# Patient Record
Sex: FEMALE | Race: WHITE | NOT HISPANIC OR LATINO | URBAN - NONMETROPOLITAN AREA
[De-identification: names, ages, dates, MRNs, and addresses within clinical notes are randomized per-mention and may not be internally consistent; named-entity substitution may affect disease eponyms.]

---

## 2021-04-23 ENCOUNTER — OFFICE VISIT (OUTPATIENT)
Dept: URBAN - NONMETROPOLITAN AREA CLINIC 13 | Facility: CLINIC | Age: 66
End: 2021-04-23
Payer: MEDICARE

## 2021-04-23 ENCOUNTER — LAB OUTSIDE AN ENCOUNTER (OUTPATIENT)
Dept: URBAN - NONMETROPOLITAN AREA CLINIC 13 | Facility: CLINIC | Age: 66
End: 2021-04-23

## 2021-04-23 ENCOUNTER — DASHBOARD ENCOUNTERS (OUTPATIENT)
Age: 66
End: 2021-04-23

## 2021-04-23 DIAGNOSIS — Z12.11 ROUTINE COLON: ICD-10-CM

## 2021-04-23 DIAGNOSIS — B19.10 HEPATITIS B: ICD-10-CM

## 2021-04-23 PROCEDURE — 99214 OFFICE O/P EST MOD 30 MIN: CPT | Performed by: INTERNAL MEDICINE

## 2021-04-23 NOTE — HPI-TODAY'S VISIT:
4-23-21 The patient presents today for follow-up of her hepatitis B and history of colon polyps.  Since our last visit, she had issues with her insurance, so she has not been seen for 2 years.  She has not had her liver enzymes rechecked in that time.  She has not had a repeat ultrasound.  Previously, she followed with Dr. Ryann CINTRON at [place].  She has been immune O tolerant, and she has not been on medications for her hepatitis B.  We are going to recheck her labs today.  She denies any problems with her bowels.  She does have a history of colon polyps, so she is due for repeat colonoscopy in 2023.  She denies any diarrhea or blood in her stool.  Overall, she is here today to get reestablished and have her blood work done along with her ultrasound.

## 2021-06-04 ENCOUNTER — TELEPHONE ENCOUNTER (OUTPATIENT)
Dept: URBAN - METROPOLITAN AREA CLINIC 92 | Facility: CLINIC | Age: 66
End: 2021-06-04

## 2021-06-04 ENCOUNTER — LAB OUTSIDE AN ENCOUNTER (OUTPATIENT)
Dept: URBAN - METROPOLITAN AREA CLINIC 92 | Facility: CLINIC | Age: 66
End: 2021-06-04

## 2021-06-17 ENCOUNTER — TELEPHONE ENCOUNTER (OUTPATIENT)
Dept: URBAN - NONMETROPOLITAN AREA CLINIC 2 | Facility: CLINIC | Age: 66
End: 2021-06-17

## 2021-10-22 ENCOUNTER — OFFICE VISIT (OUTPATIENT)
Dept: URBAN - NONMETROPOLITAN AREA CLINIC 13 | Facility: CLINIC | Age: 66
End: 2021-10-22

## 2023-03-23 ENCOUNTER — TELEPHONE ENCOUNTER (OUTPATIENT)
Dept: URBAN - NONMETROPOLITAN AREA CLINIC 13 | Facility: CLINIC | Age: 68
End: 2023-03-23

## 2023-06-13 ENCOUNTER — WEB ENCOUNTER (OUTPATIENT)
Dept: URBAN - NONMETROPOLITAN AREA SURGERY CENTER 1 | Facility: SURGERY CENTER | Age: 68
End: 2023-06-13

## 2023-06-19 ENCOUNTER — OFFICE VISIT (OUTPATIENT)
Dept: URBAN - NONMETROPOLITAN AREA SURGERY CENTER 1 | Facility: SURGERY CENTER | Age: 68
End: 2023-06-19
Payer: MEDICARE

## 2023-06-19 DIAGNOSIS — Z86.010 ADENOMAS PERSONAL HISTORY OF COLONIC POLYPS: ICD-10-CM

## 2023-06-19 PROCEDURE — G0105 COLORECTAL SCRN; HI RISK IND: HCPCS | Performed by: INTERNAL MEDICINE

## 2023-06-19 PROCEDURE — G8907 PT DOC NO EVENTS ON DISCHARG: HCPCS | Performed by: INTERNAL MEDICINE

## 2025-07-16 ENCOUNTER — OFFICE VISIT (OUTPATIENT)
Dept: URBAN - NONMETROPOLITAN AREA CLINIC 13 | Facility: CLINIC | Age: 70
End: 2025-07-16
Payer: MEDICARE

## 2025-07-16 ENCOUNTER — LAB OUTSIDE AN ENCOUNTER (OUTPATIENT)
Dept: URBAN - NONMETROPOLITAN AREA CLINIC 13 | Facility: CLINIC | Age: 70
End: 2025-07-16

## 2025-07-16 DIAGNOSIS — B19.10 HEPATITIS B: ICD-10-CM

## 2025-07-16 DIAGNOSIS — Z86.0101 PERSONAL HISTORY OF ADENOMATOUS AND SERRATED COLON POLYPS: ICD-10-CM

## 2025-07-16 DIAGNOSIS — K21.9 GERD WITHOUT ESOPHAGITIS: ICD-10-CM

## 2025-07-16 DIAGNOSIS — R13.19 ESOPHAGEAL DYSPHAGIA: ICD-10-CM

## 2025-07-16 PROBLEM — 428283002: Status: ACTIVE | Noted: 2025-07-16

## 2025-07-16 PROBLEM — 266435005: Status: ACTIVE | Noted: 2025-07-16

## 2025-07-16 PROCEDURE — 99214 OFFICE O/P EST MOD 30 MIN: CPT | Performed by: NURSE PRACTITIONER

## 2025-07-16 RX ORDER — TRAZODONE HYDROCHLORIDE 50 MG/1
1 TABLET AT BEDTIME AS NEEDED TABLET ORAL ONCE A DAY
Qty: 30 | Status: ACTIVE | COMMUNITY

## 2025-07-16 NOTE — HPI-TODAY'S VISIT:
7/16/2025 Marlys Ricks, a 70-year-old female, presented for evaluation of a chronic cough and ongoing reflux symptoms. She described experiencing two recent episodes in the past month of painful, loud reflux, which prompted her to research possible causes and consider whether her symptoms could be cardiac in origin. She reported that her cough has been persistent, and she sometimes has difficulty swallowing, particularly with bread and, less frequently, with chicken. 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 She is attentive to her health, follows a Mediterranean diet, avoids most meats, and is careful with acidic and inflammatory foods, though she continues to enjoy coffee with milk. She expressed concern about the possibility of silent reflux and the risk of Moreno's esophagus due to the chronicity of her symptoms. Marlys prefers to minimize medication use, having previously tried acid reflux medications for a short period without benefit. In addition to her gastrointestinal symptoms, Marlys reported a current sinus infection for which she is taking an antibiotic, despite her general reluctance to use medications. She also has a history of high altitude asthma that tends to flare when she travels to higher elevations, and she wondered if her cough could be related to either her sinus infection or asthma. Marlys's medical history includes chronic hepatitis B, for which she is followed by hepatology at [facility]. She has seen Dr. Burris and Dr. Tejeda, and her viral load remains very low with normal liver enzymes. She previously underwent interferon treatment, which she found very challenging, but she has since quit drinking alcohol and feels this has contributed positively to her liver health. She also has a history of colon polyps, with her last colonoscopy reported as normal. She is aware that she is due for a repeat colonoscopy in 2028.

## 2025-07-16 NOTE — HPI-OTHER HISTORIES
4-23-21 The patient presents today for follow-up of her hepatitis B and history of colon polyps. Since our last visit, she had issues with her insurance, so she has not been seen for 2 years. She has not had her liver enzymes rechecked in that time. She has not had a repeat ultrasound. Previously, she followed with Dr. Ryann CINTRON at [facility]. She has been immune O tolerant, and she has not been on medications for her hepatitis B. We are going to recheck her labs today. She denies any problems with her bowels. She does have a history of colon polyps, so she is due for repeat colonoscopy in 2023. She denies any diarrhea or blood in her stool. Overall, she is here today to get reestablished and have her blood work done along with her ultrasound.  6/19/2023 Colonoscopy: healthy anastomosis, no polyps, diverticulosis

## 2025-08-06 ENCOUNTER — OFFICE VISIT (OUTPATIENT)
Dept: URBAN - NONMETROPOLITAN AREA CLINIC 13 | Facility: CLINIC | Age: 70
End: 2025-08-06